# Patient Record
Sex: MALE | Race: WHITE | NOT HISPANIC OR LATINO | URBAN - METROPOLITAN AREA
[De-identification: names, ages, dates, MRNs, and addresses within clinical notes are randomized per-mention and may not be internally consistent; named-entity substitution may affect disease eponyms.]

---

## 2018-06-06 PROBLEM — Z00.00 ENCOUNTER FOR PREVENTIVE HEALTH EXAMINATION: Status: ACTIVE | Noted: 2018-06-06

## 2020-11-10 ENCOUNTER — EMERGENCY (EMERGENCY)
Facility: HOSPITAL | Age: 35
LOS: 1 days | Discharge: HOME | End: 2020-11-10
Attending: EMERGENCY MEDICINE | Admitting: EMERGENCY MEDICINE
Payer: MEDICAID

## 2020-11-10 VITALS
HEART RATE: 75 BPM | TEMPERATURE: 99 F | SYSTOLIC BLOOD PRESSURE: 123 MMHG | OXYGEN SATURATION: 100 % | DIASTOLIC BLOOD PRESSURE: 63 MMHG | RESPIRATION RATE: 18 BRPM

## 2020-11-10 LAB
ALBUMIN SERPL ELPH-MCNC: 5.4 G/DL — HIGH (ref 3.3–5)
ALP SERPL-CCNC: 65 U/L — SIGNIFICANT CHANGE UP (ref 40–120)
ALT FLD-CCNC: 15 U/L — SIGNIFICANT CHANGE UP (ref 4–41)
ANION GAP SERPL CALC-SCNC: 13 MMO/L — SIGNIFICANT CHANGE UP (ref 7–14)
AST SERPL-CCNC: 21 U/L — SIGNIFICANT CHANGE UP (ref 4–40)
BILIRUB SERPL-MCNC: 1.2 MG/DL — SIGNIFICANT CHANGE UP (ref 0.2–1.2)
BUN SERPL-MCNC: 7 MG/DL — SIGNIFICANT CHANGE UP (ref 7–23)
CALCIUM SERPL-MCNC: 10.4 MG/DL — SIGNIFICANT CHANGE UP (ref 8.4–10.5)
CHLORIDE SERPL-SCNC: 101 MMOL/L — SIGNIFICANT CHANGE UP (ref 98–107)
CO2 SERPL-SCNC: 23 MMOL/L — SIGNIFICANT CHANGE UP (ref 22–31)
CREAT SERPL-MCNC: 0.97 MG/DL — SIGNIFICANT CHANGE UP (ref 0.5–1.3)
GLUCOSE SERPL-MCNC: 108 MG/DL — HIGH (ref 70–99)
HCT VFR BLD CALC: 41.2 % — SIGNIFICANT CHANGE UP (ref 39–50)
HGB BLD-MCNC: 14.3 G/DL — SIGNIFICANT CHANGE UP (ref 13–17)
LIDOCAIN IGE QN: 42.2 U/L — SIGNIFICANT CHANGE UP (ref 7–60)
MCHC RBC-ENTMCNC: 30.2 PG — SIGNIFICANT CHANGE UP (ref 27–34)
MCHC RBC-ENTMCNC: 34.7 % — SIGNIFICANT CHANGE UP (ref 32–36)
MCV RBC AUTO: 86.9 FL — SIGNIFICANT CHANGE UP (ref 80–100)
NRBC # FLD: 0 K/UL — SIGNIFICANT CHANGE UP (ref 0–0)
PLATELET # BLD AUTO: 221 K/UL — SIGNIFICANT CHANGE UP (ref 150–400)
PMV BLD: 12.4 FL — SIGNIFICANT CHANGE UP (ref 7–13)
POTASSIUM SERPL-MCNC: 3.6 MMOL/L — SIGNIFICANT CHANGE UP (ref 3.5–5.3)
POTASSIUM SERPL-SCNC: 3.6 MMOL/L — SIGNIFICANT CHANGE UP (ref 3.5–5.3)
PROT SERPL-MCNC: 7.7 G/DL — SIGNIFICANT CHANGE UP (ref 6–8.3)
RBC # BLD: 4.74 M/UL — SIGNIFICANT CHANGE UP (ref 4.2–5.8)
RBC # FLD: 11.4 % — SIGNIFICANT CHANGE UP (ref 10.3–14.5)
SODIUM SERPL-SCNC: 137 MMOL/L — SIGNIFICANT CHANGE UP (ref 135–145)
WBC # BLD: 6.49 K/UL — SIGNIFICANT CHANGE UP (ref 3.8–10.5)
WBC # FLD AUTO: 6.49 K/UL — SIGNIFICANT CHANGE UP (ref 3.8–10.5)

## 2020-11-10 PROCEDURE — 99284 EMERGENCY DEPT VISIT MOD MDM: CPT

## 2020-11-10 PROCEDURE — 99053 MED SERV 10PM-8AM 24 HR FAC: CPT

## 2020-11-10 RX ORDER — LIDOCAINE 4 G/100G
10 CREAM TOPICAL ONCE
Refills: 0 | Status: COMPLETED | OUTPATIENT
Start: 2020-11-10 | End: 2020-11-10

## 2020-11-10 RX ORDER — ONDANSETRON 8 MG/1
4 TABLET, FILM COATED ORAL ONCE
Refills: 0 | Status: COMPLETED | OUTPATIENT
Start: 2020-11-10 | End: 2020-11-10

## 2020-11-10 RX ORDER — SODIUM CHLORIDE 9 MG/ML
1000 INJECTION INTRAMUSCULAR; INTRAVENOUS; SUBCUTANEOUS ONCE
Refills: 0 | Status: COMPLETED | OUTPATIENT
Start: 2020-11-10 | End: 2020-11-10

## 2020-11-10 RX ADMIN — Medication 30 MILLILITER(S): at 06:05

## 2020-11-10 RX ADMIN — SODIUM CHLORIDE 1000 MILLILITER(S): 9 INJECTION INTRAMUSCULAR; INTRAVENOUS; SUBCUTANEOUS at 06:06

## 2020-11-10 RX ADMIN — LIDOCAINE 10 MILLILITER(S): 4 CREAM TOPICAL at 06:05

## 2020-11-10 RX ADMIN — ONDANSETRON 4 MILLIGRAM(S): 8 TABLET, FILM COATED ORAL at 06:05

## 2020-11-10 NOTE — ED PROVIDER NOTE - OBJECTIVE STATEMENT
36 yo M with no pertinent PMH presenting with nightly episodes of burning epigastric pain x 10 days worsening tonight associated with nausea and vomiting. Patient reports recent high amounts of stress related to his family over the last several months that he believes is causing his symptoms. He was scheduled to have an endoscopy to evaluate for ulcers next week. He denies fevers, chills, diarrhea.

## 2020-11-10 NOTE — ED ADULT TRIAGE NOTE - CHIEF COMPLAINT QUOTE
Pt c/o abd pain and nausea. Had GI appt today told has possible ulcer. Pt reports unable to eat or sleep x9 days. Denies any other medical hx/medications. Pt c/o abd pain and nausea. Had GI appt today told has possible ulcer. Pt reports unable to eat or sleep x9 days. As per father believes pt "may be hallucinating from lack of sleep?" Denies AH/VH, drugs/ETOH. Denies any other medical hx/medications.  Son Earl : [phone]

## 2020-11-10 NOTE — ED PROVIDER NOTE - CLINICAL SUMMARY MEDICAL DECISION MAKING FREE TEXT BOX
36 yo M with no pertinent PMH p/w severe epigastric pain, nausea/nonbloody emesis. Patient nontoxic, anxious appearing. Abdomen is soft with TTP in the epigastrium, no rebound/guarding. Likely peptic ulcer disease vs gastritis. Low suspicion for pancreatitis in young otherwise healthy nondrinker. Will order CBC, CMP, lipase, treat pain and reassess.

## 2020-11-10 NOTE — ED PROVIDER NOTE - NSFOLLOWUPINSTRUCTIONS_ED_ALL_ED_FT
You were seen in the emergency department for epigastric abdominal pain. You had blood work done and were given medications.    Please continue taking your Nexium as instructed by your gastroenterologist.    You may take Pepcid over the counter. Please see medication warnings and read the labels.    Please follow up with your primary care physician and your gastroenterologist.    Please return to the emergency department for worsening of your symptoms.

## 2020-11-10 NOTE — ED PROVIDER NOTE - ATTENDING CONTRIBUTION TO CARE
34 yo with no PMH with 10 days of intermittent burning severe epigastric pain that does not radiate.  Tonight was associated with some NBNB NV.  Went to GI and was told could be ulcers and is scheduled for an EGD.      Gen: Well appearing in NAD  Head: NC/AT  Neck: trachea midline  Resp:  No distress  Abd: soft NT ND  Ext: no deformities  Neuro:  A&O appears non focal  Skin:  Warm and dry as visualized  Psych:  Normal affect and mood     34 yo with epigastric burning.  most consistent with gastritis vs gerd vs PUD.  Will get labs to ensure it is not related to pancreatitis or liver disease.  Will treat symptomatically with GI cocktail.  If improves supports leading Dx.  Not consistent with an atypical ACS.

## 2020-11-10 NOTE — ED PROVIDER NOTE - PROGRESS NOTE DETAILS
Winston Little MD. pt signed out to me pending reassessment and labs. abd soft, non tender. labs noted and explained to patient. pt states he is under a lot of stress due to employment and family issues and is unable to sleep. gael si/hi. states he has an appt w/ GI for endoscopy next week. advised to c/w nexium and to start pepcid otc, as well as zofran prn. return precautions provided. pt provided crisis center [facility].

## 2020-11-10 NOTE — ED PROVIDER NOTE - PATIENT PORTAL LINK FT
You can access the FollowMyHealth Patient Portal offered by [facility] by registering at the following website: [email or web address]. By joining [employer]’s FollowMyHealth portal, you will also be able to view your health information using other applications (apps) compatible with our system.

## 2020-11-10 NOTE — ED ADULT NURSE NOTE - CHIEF COMPLAINT QUOTE
Pt c/o abd pain and nausea. Had GI appt today told has possible ulcer. Pt reports unable to eat or sleep x9 days. As per father believes pt "may be hallucinating from lack of sleep?" Denies AH/VH, drugs/ETOH. Denies any other medical hx/medications.  Son Earl : [phone]

## 2020-11-10 NOTE — ED PROVIDER NOTE - PHYSICAL EXAMINATION
Gen: Patient is anxious appearing, NAD, AAOx3.  HEENT: NCAT, EOMI, PATRICIA, normal conjunctiva, tongue midline, oral mucosa moist.  Lung: CTAB, no respiratory distress, no wheezes/rhonchi/rales B/L, speaking in full sentences.  CV: RRR, no murmurs, rubs or gallops, distal pulses 2+ b/l.  Abd: soft, mild TTP in epigastrium, ND, no guarding, no rigidity, no rebound tenderness, no CVA tenderness.   MSK: no visible deformities, ROM normal in UE/LE.  Neuro: No focal sensory or motor deficits.  Skin: Warm, well perfused, no rash, no leg swelling.

## 2020-11-10 NOTE — ED ADULT NURSE NOTE - OBJECTIVE STATEMENT
35 yr old male pt presents to ED for evaluation of abdominal pain. pt states he has been having dull burning pain x10 days along with multiple episodes of nausea and vomiting, pt endorses high levels of stress which he believes contributed to his current symptoms, pt suspects he has peptic ulcers, mentions a visit he had with a GI provider yesterday and that he is scheduled for endoscopy in ten days. pt evaluated by provider, lab work collected, iv placed 20 g to right arm, medicated as ordered. awaiting lab results and disposition.